# Patient Record
Sex: MALE | Race: WHITE | NOT HISPANIC OR LATINO | URBAN - NONMETROPOLITAN AREA
[De-identification: names, ages, dates, MRNs, and addresses within clinical notes are randomized per-mention and may not be internally consistent; named-entity substitution may affect disease eponyms.]

---

## 2017-04-10 ENCOUNTER — FOLLOW UP ESTABLISHED (OUTPATIENT)
Dept: URBAN - NONMETROPOLITAN AREA CLINIC 6 | Facility: CLINIC | Age: 82
End: 2017-04-10
Payer: MEDICARE

## 2017-04-10 DIAGNOSIS — H35.372 PUCKERING OF MACULA, LEFT EYE: ICD-10-CM

## 2017-04-10 DIAGNOSIS — Z96.1 PRESENCE OF INTRAOCULAR LENS: Primary | ICD-10-CM

## 2017-04-10 PROCEDURE — 92014 COMPRE OPH EXAM EST PT 1/>: CPT | Performed by: OPTOMETRIST

## 2017-04-10 PROCEDURE — 92134 CPTRZ OPH DX IMG PST SGM RTA: CPT | Performed by: OPTOMETRIST

## 2017-04-10 ASSESSMENT — INTRAOCULAR PRESSURE
OS: 18
OD: 16

## 2022-12-01 ENCOUNTER — DOCUMENTATION ONLY (OUTPATIENT)
Dept: ADMINISTRATIVE | Facility: OTHER | Age: 87
End: 2022-12-01
Payer: MEDICARE

## 2022-12-06 NOTE — PROGRESS NOTES
RADIATION ONCOLOGY CONSULTATION  [facility]        NAME: Karan English   : 1932 SEX: M MR#: [id number]   DIAGNOSIS: 1. History of prostate cancer.   2. History of colorectal cancer.   3. Stage IV malignant melanoma.   REFERRING PHYSICIAN: Basim Diaz MD   DATE OF CONSULTATION: 2022       IDENTIFICATION:  The patient is a 89-year-old male with a history of prostate cancer, a history of colorectal carcinoma and a history of a facial malignant melanoma, status post resection, who now presents with a newly diagnosed stage IV malignant melanoma with painful abdominal soft tissue metastasis.  The patient is being seen in consultation for consideration of radiotherapy at the request of Dr. Diaz.    HISTORY OF PRESENT ILLNESS:  Of note, the patient is a relatively poor historian.  He was diagnosed with prostate cancer approximately 30 years ago with an elevated PSA of 32.5.  At that time, he underwent a radical prostatectomy in 1992, demonstrating [place] score of 2+4=6 adenocarcinoma.  He subsequently experienced a PSA failure with his PSA rising up to 21.6 in 2010, for which he has since been treated with intermittent and definite palliative androgen deprivation therapy for the last 12 years.  He reports he declined salvage radiation at that time.     He also has a history of colorectal cancer, status post resection in  without adjuvant treatment. The details are unavailable at today's consultation.  He also reports a history of a forehead malignant melanoma, for which he underwent resection without sentinel lymph node biopsy by Dr. Gandara approximately 10 years ago, not requiring adjuvant treatment.  He also reports a history of multiple non-melanoma basal cell carcinomas and squamous cell carcinomas, treated with resection.     CT of the abdomen and pelvis with contrast on 2021, showed no acute findings.  There was an abdominal aortic calcified  plaque noted, as well as extensive colonic diverticulosis.  MRI of the brain with and without contrast on the same date showed no acute abnormalities.  Repeat MRI of the brain on 03/07/2022, showed a new, but remote left occipital infarct and generalized volume loss.     In early June of 2022, he began to first experience abdominal pain.  MRI of the abdomen obtained at his second home in [place] on 07/25/2022, which is not available, reportedly demonstrated a 3.3 x 2.6 cm heterogeneously enhancing left adrenal lesion.  There was a 9 mm nodular focus along the posterior right hemidiaphragm.  The patient reports that he subsequently moved back to his home in [place] for further workup and treatment.     He saw Dr. Diaz of Medical Oncology on 09/28/2022, who recommended a CT of the chest, abdomen and pelvis.  He noted there were no abnormalities noted on biochemical tests, as well as likely need for a biopsy.  He also recommended discussing with Urology continued management of his androgen deprivation therapy.  CT of the chest without contrast on 10/10/2022, showed new bilateral pulmonary nodules up to 1.3 cm in the right upper lobe and a small left-sided pleural effusion.  CT of the abdomen and pelvis with and without on the same date showed a 5.9 x 4.4 x 5.3 cm heterogeneous left retroperitoneal lesion, possibly arising from the left adrenal gland versus invading the adrenal gland and encasing the left renal artery.  The mass abutted the aorta, celiac artery, and superior mesenteric artery and left renal vein.  It was also seen to invade the left hemidiaphragm. There was an ill-defined hypodensity noted in the inferior right liver measuring 2.7 x 2.2 x 2.4 cm.     He was seen again by Dr. Diaz on 10/12/2022, at which time he was referred to Interventional Radiology, and prescribed Marinol and Percocet.  Liver ultrasound on 10/18/2022, showed a 2.8 cm isoechoic mass in the right liver lobe.  Ultrasound-guided  liver biopsy on 10/31/2022, was positive for poorly differentiated carcinoma.  On further review at [facility], the final pathology returned as metastatic malignant melanoma which was SOX10, S110 strongly positive and Melan-A, SF1, NKX3.1 negative.  He saw Dr. Diaz again on11/15/2022, at which time the pathology was still pending.  He noted he was not a chemotherapy candidate secondary to age, and they are checking PD-L1 and MGS panels.     The patient reported to the emergency department at [place] on 11/29/2022, complaining of worsening abdominal and back pain. CT of the abdomen and pelvis on that date showed increased size of the left periaortic necrotic mass from 4.5 x 5.6 cm, to 7.1 x 5.3 cm.  There were multiple hepatic and pulmonary lesions consistent with metastatic disease.  In addition, there was evidence of diverticulosis.  The patient was status post cholecystectomy.  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     He saw Dr. Diaz in follow up clinic on 12/01/2022, who noted the final pathology being consistent with metastatic melanoma, and recommended nivolumab and Yervoy immunotherapy.  He also recommended checking a BRAF and MGS panel, and noted the patient's pain and increased size of the mass, referred him to Radiation Oncology for consideration of palliative radiation.    REVIEW OF SYSTEMS:  The patient's primary complaint is intermittent abdominal pain, which is not postprandial in nature and not positional.  He notes some relief with pain medications.  Reports generalized fatigue and decreased appetite with an approximate 20- to 25-pound weight loss over the last six months.  He denies any high fevers, shaking chills or drenching night sweats.  He notes constipation and nausea, as well as a nonproductive cough.  He notes decreased vision bilaterally following a  stroke last year.  He reports increased tremors over the past two months and occasional hallucinations with medications.     He denies any recent changes in vision, hearing or swallowing, chest pain, shortness of breath, dyspnea on exertion, vomiting, diarrhea, bright-red blood per rectum or urinary symptoms.  He denies any severe headaches, diplopia, ataxia, or focal numbness, tingling or weakness.     All other systems reviewed and negative.    PAST MEDICAL HISTORY:  History of prostate cancer as above; history of colorectal cancer as above; history of metastatic melanoma; coronary artery disease, status post myocardial infarction; status post cerebrovascular accident; hypercholesterolemia; hypertension; history of shingles; hypothyroidism; atrial fibrillation; degenerative joint disease.    PAST SURGICAL HISTORY:  Status post forehead melanoma resection, status post multiple basal cell carcinoma and squamous cell carcinoma resections, status post bilateral cataract extraction, status post four-vessel CABG in 2009, status post cholecystectomy, status post hemorrhoidectomy in 1958, status post right inguinal hernia repair, status post radical prostatectomy in 1992, status post tonsillectomy as a child, status post colorectal resection in 1985.    PAST RADIATION THERAPY:  None.    MEDICATIONS:  Albuterol, aliskiren, aspirin, Eliquis, Advair, Lasix, Eligard, melatonin, Remeron, Percocet, potassium chloride, ProAir, promethazine, Mckenna Thyroid, albuterol, multivitamin, vitamin C, zinc, vitamin D.    ALLERGIES:  Beta-blockers cause dysphagia.    FAMILY HISTORY:  Mother with colon cancer.  Three sisters with colon cancer.  Brother with lung cancer.  No family history of melanoma.    SOCIAL HISTORY:  The patient does not smoke, drink alcohol and denies illicit drug use.  He lives in [place], is  and has four children, two sons and two daughters. He is retired and previously worked in electronics  repair.    PHYSICAL EXAMINATION:  VITAL SIGNS:  Blood pressure 110/64, temperature 98.2, heart rate 102, height 5 feet 7 inches, oxygen saturation 98% on room air, 129 pounds.  ECOG score 1-2.   GENERAL:  The patient is a pleasant elderly  male in no acute distress, sitting comfortably in a wheelchair.   HEENT:  Normocephalic, atraumatic.  Extraocular muscles intact.  Pupils equally reactive to light with irregularity of the right pupil.  Sclerae anicteric.  Oral cavity and oropharynx are clear without erythema, exudate, masses or ulcerations.  The patient is wearing bilateral hearing aids.   NECK:  Supple without lymphadenopathy or thyromegaly.   HEART:  Irregularly irregular.  No murmurs, gallops or rubs.  There is a well-healed median sternotomy scar from previous CABG.     LUNGS:  Clear to auscultation bilaterally.  No wheezes, rhonchi or rales.   BACK: No spinal or costovertebral angle tenderness.   ABDOMEN:  Soft, nontender and nondistended.  No masses or hepatosplenomegaly.  No rebounding or guarding.   EXTREMITIES: Warm and well perfused.  No clubbing or cyanosis.  1+ bilateral lower extremity edema bilaterally.   NEUROLOGIC:  Cranial nerves two through 12 grossly intact with the exception of decreased hearing bilaterally.  Motor and sensory intact bilaterally.  Finger-nose-finger and gait within normal limits. 1+ patellar deep tendon reflexes bilaterally.   DERMATOLOGIC:  The patient has an 8 cm irregular hypopigmented scar over the central forehead from previous melanoma resection without surrounding nodules, ulcerations or regional lymphadenopathy.    LABORATORIES:  None available.    ASSESSMENT AND PLAN:  The patient is a 89-year-old male with a history of prostate cancer, history of colorectal cancer and a history of malignant melanoma, status post resection, who presents with a newly diagnosed stage IV malignant melanoma with a painful left adrenal soft tissue metastasis.     Based upon this  patient's history and presentation, I believe that he is likely an appropriate candidate for external beam radiation therapy.  After some difficulty in making the final pathologic diagnosis Mr. English appears to be presenting with stage IV malignant melanoma, most likely related to what he reports to be a previous melanoma of his forehead, for which he underwent surgical resection without lymph node dissection or adjuvant treatment approximately 10 years ago.  He is now presenting with pulmonary metastasis, liver metastases, all as a symptomatic left adrenal mass.  The patient understands that the mainstay of his future therapy will most likely be palliative systemic therapy, with plans currently being made for immunotherapy by Dr. Diaz.     I explained to the patient that the role for external beam radiation therapy in this setting is palliative alone with the goal to improve quality of life.  In Mr. English's case, he is presenting with progressively worsening abdominal pain, most likely secondary to the enlarging left adrenal metastasis.  I suspect the pain is likely due to local nerve invasion as there is no postprandial component suggesting bowel ischemia due to stenosis of the celiac and superior mesenteric arteries from local tumor compression, although he is certainly at risk for such in the future.      I explained to the patient that the goal of radiation is to decrease the patient's pain, as well as potential future associated complications, including bowel ischemia and that it would be unlikely to have any bearing on his overall survival.  I will defer to Dr. Diaz whether or not to initiate immunotherapy during or after the completion of radiation, which I anticipate to be of approximately two and a half weeks' duration due to the bulk of the disease.     The risks, benefits, side effects, alternatives to, indications for and mechanisms of external beam radiation therapy were explained in full to the  patient.  He and his family who accompanying him to today's visit asked multiple appropriate questions, all of which were answered to their apparent satisfaction.  This conversation included a discussion of possible short-term side effects, including but not limited to nausea, vomiting, abdominal pain, loose stools, diarrhea and fatigue.  We also discussed the possible long-term side effects, including but not limited to low risk of damage to the small bowel and/or stomach including ulceration, decreased left-sided renal function, and very low risk of damage to the nerves, spinal cord and/or liver.  The patient agreed with the proposed treatment plan and informed consent was obtained.     Mr. English has been scheduled to undergo a CT-guided simulation in the supine treatment position with the use of a wing board and Vac-Krystal for custom immobilization tomorrow, 12/02/2022.  After a customized treatment plan has been designed, he will undergo verification films and initiate a short course of palliative intensity-modulated radiotherapy to the left adrenal mass next week.     Thank you very much, Dr. Diaz, for this consultation and the opportunity to participate in the care of this patient.  Please do not hesitate to contact me should you have any questions, concerns and/or require additional information.        KADIE Saenz MD